# Patient Record
Sex: FEMALE | NOT HISPANIC OR LATINO | URBAN - NONMETROPOLITAN AREA
[De-identification: names, ages, dates, MRNs, and addresses within clinical notes are randomized per-mention and may not be internally consistent; named-entity substitution may affect disease eponyms.]

---

## 2019-06-29 ENCOUNTER — OFFICE VISIT (OUTPATIENT)
Dept: URGENT CARE | Facility: PHYSICIAN GROUP | Age: 24
End: 2019-06-29
Payer: COMMERCIAL

## 2019-06-29 ENCOUNTER — HOSPITAL ENCOUNTER (OUTPATIENT)
Dept: LAB | Facility: MEDICAL CENTER | Age: 24
End: 2019-06-29
Attending: FAMILY MEDICINE
Payer: COMMERCIAL

## 2019-06-29 VITALS
SYSTOLIC BLOOD PRESSURE: 102 MMHG | DIASTOLIC BLOOD PRESSURE: 64 MMHG | RESPIRATION RATE: 16 BRPM | TEMPERATURE: 98.6 F | WEIGHT: 136 LBS | OXYGEN SATURATION: 100 % | HEART RATE: 62 BPM

## 2019-06-29 DIAGNOSIS — R10.30 LOWER ABDOMINAL PAIN: ICD-10-CM

## 2019-06-29 LAB
APPEARANCE UR: NORMAL
BASOPHILS # BLD AUTO: 0.6 % (ref 0–1.8)
BASOPHILS # BLD: 0.04 K/UL (ref 0–0.12)
BILIRUB UR STRIP-MCNC: NORMAL MG/DL
COLOR UR AUTO: NORMAL
EOSINOPHIL # BLD AUTO: 0.04 K/UL (ref 0–0.51)
EOSINOPHIL NFR BLD: 0.6 % (ref 0–6.9)
ERYTHROCYTE [DISTWIDTH] IN BLOOD BY AUTOMATED COUNT: 38.3 FL (ref 35.9–50)
GLUCOSE UR STRIP.AUTO-MCNC: NORMAL MG/DL
HCT VFR BLD AUTO: 41.2 % (ref 37–47)
HGB BLD-MCNC: 13.6 G/DL (ref 12–16)
IMM GRANULOCYTES # BLD AUTO: 0.02 K/UL (ref 0–0.11)
IMM GRANULOCYTES NFR BLD AUTO: 0.3 % (ref 0–0.9)
KETONES UR STRIP.AUTO-MCNC: NORMAL MG/DL
LEUKOCYTE ESTERASE UR QL STRIP.AUTO: NORMAL
LYMPHOCYTES # BLD AUTO: 1.78 K/UL (ref 1–4.8)
LYMPHOCYTES NFR BLD: 27.8 % (ref 22–41)
MCH RBC QN AUTO: 29.9 PG (ref 27–33)
MCHC RBC AUTO-ENTMCNC: 33 G/DL (ref 33.6–35)
MCV RBC AUTO: 90.5 FL (ref 81.4–97.8)
MONOCYTES # BLD AUTO: 0.49 K/UL (ref 0–0.85)
MONOCYTES NFR BLD AUTO: 7.7 % (ref 0–13.4)
NEUTROPHILS # BLD AUTO: 4.03 K/UL (ref 2–7.15)
NEUTROPHILS NFR BLD: 63 % (ref 44–72)
NITRITE UR QL STRIP.AUTO: NORMAL
NRBC # BLD AUTO: 0 K/UL
NRBC BLD-RTO: 0 /100 WBC
PH UR STRIP.AUTO: 6 [PH] (ref 5–8)
PLATELET # BLD AUTO: 254 K/UL (ref 164–446)
PMV BLD AUTO: 11.1 FL (ref 9–12.9)
PROT UR QL STRIP: NORMAL MG/DL
RBC # BLD AUTO: 4.55 M/UL (ref 4.2–5.4)
RBC UR QL AUTO: NORMAL
SP GR UR STRIP.AUTO: 1.02
UROBILINOGEN UR STRIP-MCNC: 0.2 MG/DL
WBC # BLD AUTO: 6.4 K/UL (ref 4.8–10.8)

## 2019-06-29 PROCEDURE — 99214 OFFICE O/P EST MOD 30 MIN: CPT | Performed by: FAMILY MEDICINE

## 2019-06-29 PROCEDURE — 85025 COMPLETE CBC W/AUTO DIFF WBC: CPT

## 2019-06-29 PROCEDURE — 81002 URINALYSIS NONAUTO W/O SCOPE: CPT | Performed by: FAMILY MEDICINE

## 2019-06-29 PROCEDURE — 36415 COLL VENOUS BLD VENIPUNCTURE: CPT

## 2019-06-29 ASSESSMENT — ENCOUNTER SYMPTOMS
CONSTIPATION: 0
BLOOD IN STOOL: 0
WEIGHT LOSS: 0
FOCAL WEAKNESS: 0
EYE DISCHARGE: 0
DIARRHEA: 0
SORE THROAT: 0
SENSORY CHANGE: 0
EYE REDNESS: 0

## 2019-06-29 NOTE — PROGRESS NOTES
Subjective:      Moira Marsh is a 23 y.o. female who presents with Abdominal Pain (Lower abd pain started this AM/ )            Onset this morning lower abdominal pain. 8/10 severity without radiation. Sharp. Pain got better after urination and laying down. Pain is now mild. No dysruria. No urinary urgency or frequency. No nausea. Last BM normal without blood. LMP 6/4. No PMH abdominal surgery. No OTC medications. No other aggravating or alleviating factors.         Review of Systems   Constitutional: Negative for malaise/fatigue and weight loss.   HENT: Negative for sore throat.    Eyes: Negative for discharge and redness.   Gastrointestinal: Negative for blood in stool, constipation and diarrhea.   Skin: Negative for itching and rash.   Neurological: Negative for sensory change and focal weakness.     .  Medications, Allergies, and current problem list reviewed today in [facility]  FH: Second-degree family members with Crohn's     Objective:     /64   Pulse 62   Temp 37 °C (98.6 °F) (Temporal)   Resp 16   Wt 61.7 kg (136 lb)   SpO2 100%      Physical Exam   Constitutional: She is oriented to person, place, and time. She appears well-developed and well-nourished. No distress.   HENT:   Head: Normocephalic.   Mouth/Throat: Oropharynx is clear and moist.   Eyes: Conjunctivae are normal.   Cardiovascular: Normal rate, regular rhythm and normal heart sounds.    Pulmonary/Chest: Effort normal and breath sounds normal.   Abdominal: Soft. Bowel sounds are normal. She exhibits no mass. There is tenderness (Lower). There is no rebound and no guarding.   Neurological: She is alert and oriented to person, place, and time.   Skin: Skin is warm and dry.               Assessment/Plan:   UA negative  Preg negative    1. Lower abdominal pain  CBC WITH DIFFERENTIAL    POCT Urinalysis     Differential diagnosis, natural history, supportive care, and indications for immediate follow-up discussed at length.     Appendicitis  is on differential diagnosis however patient seems to be improving since this morning.  I recommended imaging however she declines at this point.  We will follow-up CBC.  Understands to go to the ER with worsening/localizing pain.

## 2020-06-03 ENCOUNTER — OFFICE VISIT (OUTPATIENT)
Dept: MEDICAL GROUP | Facility: PHYSICIAN GROUP | Age: 25
End: 2020-06-03
Payer: COMMERCIAL

## 2020-06-03 VITALS
TEMPERATURE: 98.1 F | SYSTOLIC BLOOD PRESSURE: 126 MMHG | HEIGHT: 65 IN | BODY MASS INDEX: 19.16 KG/M2 | OXYGEN SATURATION: 96 % | WEIGHT: 115 LBS | RESPIRATION RATE: 12 BRPM | HEART RATE: 68 BPM | DIASTOLIC BLOOD PRESSURE: 82 MMHG

## 2020-06-03 DIAGNOSIS — Z13.6 SCREENING FOR CARDIOVASCULAR CONDITION: ICD-10-CM

## 2020-06-03 DIAGNOSIS — Z23 NEED FOR VACCINATION: ICD-10-CM

## 2020-06-03 DIAGNOSIS — Z00.00 PE (PHYSICAL EXAM), ANNUAL: ICD-10-CM

## 2020-06-03 PROCEDURE — 90471 IMMUNIZATION ADMIN: CPT | Performed by: NURSE PRACTITIONER

## 2020-06-03 PROCEDURE — 99395 PREV VISIT EST AGE 18-39: CPT | Mod: 25 | Performed by: NURSE PRACTITIONER

## 2020-06-03 PROCEDURE — 90715 TDAP VACCINE 7 YRS/> IM: CPT | Performed by: NURSE PRACTITIONER

## 2020-06-03 RX ORDER — COPPER 313.4 MG/1
INTRAUTERINE DEVICE INTRAUTERINE
COMMUNITY

## 2020-06-03 SDOH — HEALTH STABILITY: MENTAL HEALTH: HOW OFTEN DO YOU HAVE A DRINK CONTAINING ALCOHOL?: 2-3 TIMES A WEEK

## 2020-06-03 ASSESSMENT — PATIENT HEALTH QUESTIONNAIRE - PHQ9: CLINICAL INTERPRETATION OF PHQ2 SCORE: 0

## 2020-06-03 NOTE — PATIENT INSTRUCTIONS
Paperwork filled    Labs anytime at your convenience    Will get your last PAP results    Tdap    Follow up annually

## 2020-06-03 NOTE — ASSESSMENT & PLAN NOTE
Very pleasant 24-year-old female patient presents today to establish care with new PCP.  She has no pressing concerns or complaints and does not carry diagnosis of any chronic conditions.  She does however need unemployment paperwork filled out because she lost her job admits the COVID-19 pandemic, this was filled out for her.  Would like to get baseline labs, these have been ordered.  She has states that she is also up-to-date with Pap smear, will request those outside records.  Discussed the importance of continued healthy diet, regular physical activity, maintaining healthy weight, avoiding all tobacco products and drinking alcohol minimally according to CDC guidelines.  She is otherwise follow-up with me annually and as needed.

## 2020-06-03 NOTE — PROGRESS NOTES
Chief Complaint   Patient presents with   • Establish Care         This is a 24 y.o.female patient that presents today with the following: establish care with new PCP, paperwork for unemployment    PE (physical exam), annual  Very pleasant 24-year-old female patient presents today to establish care with new PCP.  She has no pressing concerns or complaints and does not carry diagnosis of any chronic conditions.  She does however need unemployment paperwork filled out because she lost her job admits the COVID-19 pandemic, this was filled out for her.  Would like to get baseline labs, these have been ordered.  She has states that she is also up-to-date with Pap smear, will request those outside records.  Discussed the importance of continued healthy diet, regular physical activity, maintaining healthy weight, avoiding all tobacco products and drinking alcohol minimally according to CDC guidelines.  She is otherwise follow-up with me annually and as needed.      No visits with results within 1 Month(s) from this visit.   Latest known visit with results is:   Hospital Outpatient Visit on 06/29/2019   Component Date Value   • WBC 06/29/2019 6.4    • RBC 06/29/2019 4.55    • Hemoglobin 06/29/2019 13.6    • Hematocrit 06/29/2019 41.2    • MCV 06/29/2019 90.5    • MCH 06/29/2019 29.9    • MCHC 06/29/2019 33.0*   • RDW 06/29/2019 38.3    • Platelet Count 06/29/2019 254    • MPV 06/29/2019 11.1    • Neutrophils-Polys 06/29/2019 63.00    • Lymphocytes 06/29/2019 27.80    • Monocytes 06/29/2019 7.70    • Eosinophils 06/29/2019 0.60    • Basophils 06/29/2019 0.60    • Immature Granulocytes 06/29/2019 0.30    • Nucleated RBC 06/29/2019 0.00    • Neutrophils (Absolute) 06/29/2019 4.03    • Lymphs (Absolute) 06/29/2019 1.78    • Monos (Absolute) 06/29/2019 0.49    • Eos (Absolute) 06/29/2019 0.04    • Baso (Absolute) 06/29/2019 0.04    • Immature Granulocytes (a* 06/29/2019 0.02    • NRBC (Absolute) 06/29/2019 0.00          clinical  "course has been stable    Past Medical History:   Diagnosis Date   • Healthy adult on routine physical examination        Past Surgical History:   Procedure Laterality Date   • OTHER      wisdom teeth removal       Family History   Problem Relation Age of Onset   • Thyroid Mother    • Prostate enlargement (BPH) Father    • No Known Problems Sister    • No Known Problems Brother    • Heart Disease Maternal Grandmother    • Heart Disease Maternal Grandfather        Patient has no known allergies.    Current Outpatient Medications Ordered in Epic   Medication Sig Dispense Refill   • Paragard Intrauterine Copper IUD by Intrauterine route.       No current [facility]-ordered facility-administered medications on file.        Constitutional ROS: No unexpected change in weight, No weakness, No unexplained fevers, sweats, or chills  Pulmonary ROS: No chronic cough, sputum, or hemoptysis, No shortness of breath, No recent change in breathing  Cardiovascular ROS: No chest pain, No edema, No palpitations  Gastrointestinal ROS: No abdominal pain, No nausea, vomiting, diarrhea, or constipation  Musculoskeletal/Extremities ROS: No clubbing, No peripheral edema, No pain, redness or swelling on the joints  Neurologic ROS: Normal development, No seizures, No weakness    Physical exam:  /82   Pulse 68   Temp 36.7 °C (98.1 °F) (Temporal)   Resp 12   Ht 1.651 m (5' 5\")   Wt 52.2 kg (115 lb)   LMP 05/25/2020   SpO2 96%   BMI 19.14 kg/m²   General Appearance: pleasant young female, alert, no distress, well nourished, well groomed  Skin: Skin color, texture, turgor normal. No rashes or lesions.  Lungs: negative findings: normal respiratory rate and rhythm, lungs clear to auscultation  Heart: negative. RRR without murmur, gallop, or rubs.  No ectopy.  Abdomen: Abdomen soft, non-tender. BS normal. No masses,  No organomegaly  Musculoskeletal: negative findings: no evidence of joint instability, no evidence of muscle atrophy, no " deformities present  Neurologic: intact, CN 2-12 grossly intact    Medical decision making/discussion:     Paperwork filled    Labs anytime at your convenience    Will get your last PAP results    Tdap    Follow up annually    Moira was seen today for establish care.    Diagnoses and all orders for this visit:    PE (physical exam), annual    Need for vaccination  -     Tdap Vaccine =>8YO IM    Screening for cardiovascular condition  -     CBC WITH DIFFERENTIAL; Future  -     Comp Metabolic Panel; Future  -     Lipid Profile; Future    Other orders  -     Obtain Results: Other (see comment); Obtain Results From: Other (see comment)        Return in about 1 year (around 6/3/2021) for Follow-up.        Please note that this dictation was created using voice recognition software. I have made every reasonable attempt to correct obvious errors, but I expect that there are errors of grammar and possibly content that I did not discover before finalizing the note.

## 2020-10-26 ENCOUNTER — HOSPITAL ENCOUNTER (OUTPATIENT)
Dept: RADIOLOGY | Facility: MEDICAL CENTER | Age: 25
End: 2020-10-26
Attending: OBSTETRICS & GYNECOLOGY
Payer: COMMERCIAL

## 2020-10-26 DIAGNOSIS — Z30.430 ENCOUNTER FOR INSERTION OF INTRAUTERINE CONTRACEPTIVE DEVICE: ICD-10-CM

## 2020-10-26 PROCEDURE — 76830 TRANSVAGINAL US NON-OB: CPT
